# Patient Record
Sex: MALE
[De-identification: names, ages, dates, MRNs, and addresses within clinical notes are randomized per-mention and may not be internally consistent; named-entity substitution may affect disease eponyms.]

---

## 2024-01-25 PROBLEM — Z00.00 ENCOUNTER FOR PREVENTIVE HEALTH EXAMINATION: Status: ACTIVE | Noted: 2024-01-25

## 2024-01-26 DIAGNOSIS — E78.5 HYPERLIPIDEMIA, UNSPECIFIED: ICD-10-CM

## 2024-01-26 DIAGNOSIS — I25.10 ATHEROSCLEROTIC HEART DISEASE OF NATIVE CORONARY ARTERY W/OUT ANGINA PECTORIS: ICD-10-CM

## 2024-01-26 DIAGNOSIS — Z78.9 OTHER SPECIFIED HEALTH STATUS: ICD-10-CM

## 2024-01-26 DIAGNOSIS — Z82.49 FAMILY HISTORY OF ISCHEMIC HEART DISEASE AND OTHER DISEASES OF THE CIRCULATORY SYSTEM: ICD-10-CM

## 2024-01-26 DIAGNOSIS — Z83.438 FAMILY HISTORY OF OTHER DISORDER OF LIPOPROTEIN METABOLISM AND OTHER LIPIDEMIA: ICD-10-CM

## 2024-01-26 DIAGNOSIS — B20 HUMAN IMMUNODEFICIENCY VIRUS [HIV] DISEASE: ICD-10-CM

## 2024-01-26 DIAGNOSIS — I10 ESSENTIAL (PRIMARY) HYPERTENSION: ICD-10-CM

## 2024-01-26 DIAGNOSIS — K64.9 UNSPECIFIED HEMORRHOIDS: ICD-10-CM

## 2024-01-26 RX ORDER — ROSUVASTATIN CALCIUM 20 MG/1
20 TABLET, FILM COATED ORAL
Refills: 0 | Status: ACTIVE | COMMUNITY

## 2024-01-26 RX ORDER — ASPIRIN 81 MG
81 TABLET, DELAYED RELEASE (ENTERIC COATED) ORAL
Refills: 0 | Status: ACTIVE | COMMUNITY

## 2024-01-26 RX ORDER — LISINOPRIL 20 MG/1
20 TABLET ORAL
Refills: 0 | Status: ACTIVE | COMMUNITY

## 2024-01-26 RX ORDER — BICTEGRAVIR SODIUM, EMTRICITABINE, AND TENOFOVIR ALAFENAMIDE FUMARATE 50; 200; 25 MG/1; MG/1; MG/1
50-200-25 TABLET ORAL
Refills: 0 | Status: ACTIVE | COMMUNITY

## 2024-01-26 RX ORDER — VALACYCLOVIR 1 G/1
1 TABLET, FILM COATED ORAL
Refills: 0 | Status: ACTIVE | COMMUNITY

## 2024-01-26 RX ORDER — CLOPIDOGREL BISULFATE 75 MG/1
75 TABLET, FILM COATED ORAL
Refills: 0 | Status: ACTIVE | COMMUNITY

## 2024-01-29 ENCOUNTER — APPOINTMENT (OUTPATIENT)
Dept: COLORECTAL SURGERY | Facility: CLINIC | Age: 59
End: 2024-01-29
Payer: COMMERCIAL

## 2024-01-29 VITALS
HEIGHT: 70 IN | HEART RATE: 95 BPM | WEIGHT: 183 LBS | OXYGEN SATURATION: 99 % | SYSTOLIC BLOOD PRESSURE: 115 MMHG | RESPIRATION RATE: 18 BRPM | BODY MASS INDEX: 26.2 KG/M2 | DIASTOLIC BLOOD PRESSURE: 74 MMHG

## 2024-01-29 DIAGNOSIS — K64.4 RESIDUAL HEMORRHOIDAL SKIN TAGS: ICD-10-CM

## 2024-01-29 PROCEDURE — 99203 OFFICE O/P NEW LOW 30 MIN: CPT | Mod: 25

## 2024-01-29 PROCEDURE — 46600 DIAGNOSTIC ANOSCOPY SPX: CPT

## 2024-01-29 RX ORDER — HYDROCORTISONE 25 MG/G
2.5 CREAM TOPICAL 3 TIMES DAILY
Qty: 1 | Refills: 3 | Status: ACTIVE | COMMUNITY
Start: 2024-01-29 | End: 1900-01-01

## 2024-01-29 NOTE — HISTORY OF PRESENT ILLNESS
[FreeTextEntry1] : 59yo male presents for initial evaluation  Referred by PCP Dr Koenig Reason for visit "hemorrhoids"  PMH HIV, HTN, hyperlipidemia Meds Biktarvy, Valacyclovir, Rosuvastatin, Plavix, Lisinopril, ASA 81mg PSH cardiac stents x 3 in May 2021 Social - 6-8 alcoholic drinks per week, reports h/o MDMA   GI Dr Read Family history of father with colon cancer diagnosed in his 60s. Getting colonoscopies every 5 years, next due in 2026. Prior colonoscopy showed mild diverticulosis Most recent colonoscopy 11/8/2021 - diverticulosis in recto-sigmoid colon and sigmoid colon, non-bleeding internal hemorrhoids, no specimens collected.   Takes Plavix and ASA 81mg daily for h/o cardiac stents placed in 2021. States he has seen cardiologist in follow up and was told to stay on it long term.   Patient reports he is having BRB with BM, comes and goes. Usually on TP, rarely on stool surface or in toilet bowl.  Has used preparation H at times. Feels a lump in the anal area as of a few months ago. He hoped it would go on own with applying preparation H but it hasn't.  Not growing in size, at times may feels/smaller/firmer/like a nodule, other times more swollen. Denies anorectal pain.   Never given an Rx for hemorrhoid previously. No prior treatments for hemorrhoids.   He states stool have become harder since starting cholesterol medications 3-4 years ago. Started taking apple cider vinegar gummies which helped. His statin medication was changed again recently and stools became harder again.  Does not taking any stool softeners, laxatives or fiber supplements.  BMs are daily, 1-2x per day, occasional pushing or straining, but admits to holding his stool at times until he gets home from work because he doesn't want to have BM at work.

## 2024-01-29 NOTE — ASSESSMENT
[FreeTextEntry1] : Exam findings and diagnosis were discussed at length with patient.  Recommendations including increased fiber intake, adequate daily hydration, stool softeners as needed, and sitz baths as needed and after bowel movements were discussed. Recommend fiber supplement. Avoid constipation and diarrhea, avoid pushing/straining. Medical management, such as witch hazel and hydrocortisone cream, was discussed as needed. Ambulatory surgery to excise was discussed as an alternative. The nature of the procedure as well as risks and benefits were reviewed with the patient. If surgery to be pursued, we discussed need for cardiac clearance and perioperative anticoagulation plan. Patient would like to continue with medical management/supportive care at this time. He will contact office if symptoms worsen, persist, or he would like to proceed with surgical planning. All questions answered, patient expressed understanding and is agreeable to this plan.

## 2024-01-29 NOTE — PHYSICAL EXAM
[FreeTextEntry1] : Medical assistant present for duration of physical examination   General no acute distress, alert and oriented Psych calm, pleasant demeanor, responding appropriately to questions Nonlabored breathing Ambulating without assistance Skin normal color and pigment, no visible lesions or rashes    Anorectal Exam: Inspection no erythema, induration or fluctuance, no skin excoriation, no fissure, right posterior external hemorrhoid with edema without thrombosis HANSA nontender, no masses palpated, no blood on gloved finger   Procedure: Anoscopy   Pre procedure Diagnosis: external hemorrhoid Post procedure Diagnosis: external hemorrhoid Anesthesia: none Estimated blood loss: none Specimen: none Complications: none   Consent obtained. Anoscopy was performed by passing a lighted anoscope with lubricant jelly into the anal canal and the entire anal mucosal surface was inspected. Findings included no fissure, mild internal hemorrhoids, no visible masses or lesions in anal canal   Patient tolerated examination and procedure well.